# Patient Record
Sex: FEMALE | Race: WHITE
[De-identification: names, ages, dates, MRNs, and addresses within clinical notes are randomized per-mention and may not be internally consistent; named-entity substitution may affect disease eponyms.]

---

## 2019-06-24 ENCOUNTER — HOSPITAL ENCOUNTER (INPATIENT)
Dept: HOSPITAL 84 - D.SDCHOLD | Age: 54
LOS: 23 days | Discharge: HOME | DRG: 743 | End: 2019-07-17
Attending: OBSTETRICS & GYNECOLOGY | Admitting: OBSTETRICS & GYNECOLOGY
Payer: MEDICAID

## 2019-06-24 VITALS — WEIGHT: 143.3 LBS | BODY MASS INDEX: 24.46 KG/M2 | HEIGHT: 64 IN | BODY MASS INDEX: 24.46 KG/M2

## 2019-06-24 DIAGNOSIS — D25.9: ICD-10-CM

## 2019-06-24 DIAGNOSIS — N93.8: Primary | ICD-10-CM

## 2019-06-24 NOTE — OP
PATIENT NAME:  PIPE HERNANDEZ                              MEDICAL RECORD: [id number]
:69                                             LOCATION:[facility]     D.[id number]
                                                         ADMISSION DATE:07/15/19
SURGEON:  ROBEL GILBERT MD          
 
 
DATE OF OPERATION:  07/15/2019
 
PREOPERATIVE DIAGNOSES:
1.  Dysfunctional uterine bleeding.
2.  Fibroid uterus.
 
POSTOPERATIVE DIAGNOSES:
1.  Dysfunctional uterine bleeding.
2.  Fibroid uterus.
 
PROCEDURE:
1.  Exploratory laparotomies.
2.  Bilateral salpingo-oophorectomy.
3.  Total abdominal hysterectomy.
 
SURGEON:  Robel Gilbert MD
 
CRNA:  Elias Sen.
 
ANESTHESIOLOGIST:  Dr. Rivera
 
ANESTHETIC:  General.
 
FINDINGS:  Uterus is enlarged with multiple irregularities to the contour of the
surface consistent with large fibroid uterus.  The ovaries, tubes were
unremarkable bilaterally.  The blood vessels of the uterine vascular bundle are
engorged.  Uterine weight was 1460 grams.
 
SPECIMENS REMOVED:
1.  Bilateral tubes.
2.  Bilateral ovaries.
3.  Uterus with cervix.
 
SPECIMEN DISPOSITION:  All specimens to pathology.
 
ESTIMATED BLOOD LOSS:  200 cc.
 
FLUIDS:  1500 cc lactated Ringer's.
 
URINE OUTPUT:  500 cc of clear urine.
 
COMPLICATIONS:  None.
 
DRAINS:  Romano to gravity.
 
INDICATIONS:  The patient is a 53-year-old female with heavy periods with pain
and pressure.  The patient was found to have fibroid uterus.  The patient was
consented for a OZZY-BSO due to uterine size.
 
DESCRIPTION OF PROCEDURE:  After informed consent was assured, the patient was
taken to the operating room where anesthetic was obtained without difficulty. 
The patient was prepped and draped in the usual sterile fashion.  Then,
 
 
 
OPERATIVE REPORT                               [id number]    PIPE HERNANDEZ            
 
 
Pfannenstiel skin incision was made and the abdomen was entered.  The uterus was
identified, brought to the incision and elevated.  The right round ligament was
elevated with a Galina clamp and a stitch applied to the round ligament
laterally.  The round ligament was incised and the broad ligament was opened
with Bovie cautery.  Using a suction tip catheter to undermine the peritoneum,
the dissection was carried down inferiorly to the midline CHCF developing the
bladder flap.  Posteriorly, the tissues taken down and a window was created in
the posterior leaf of the broad ligament.  Two clamps were placed through this
window below the ovary and tube.  The third clamp was passed in this opening
medial to prevent backbleeding.  The pedicle was now mobilized sharply and a tie
on a pass and a fore-and-aft stitch applied on the infundibulopelvic ligament to
obtain hemostasis.  The connective tissue of the right vascular bundle was taken
down and then 2 clamps applied at the level of the internal os.  A straight
clamp was placed above this to prevent backbleeding.  The pedicle was mobilized
with scissors.  A simple stitch and then a Gregorio stitch applied for hemostasis
at the right vascular bundle.  Attention was now directed to the left side.  The
left round ligament was elevated and entered in similar fashion.  Again, the
peritoneum was undermined inferiorly until the bladder flap was now fully
developed.  The posterior window was made in the broad ligament and 2 clamps
passed below the left tube and ovary.  This pedicle was mobilized with scissors.
 A tie and a pass and a fore-and-aft stitch applied for hemostasis at the
infundibulopelvic ligament.  The connective tissue of the left vascular bundle
was taken down and 2 clamps were applied here.  Straight clamps were applied for
backbleeding and then the pedicle mobilized.  A simple stitch and a Gregorio
stitch applied for hemostasis at the left vascular bundle.  The uterus was now
removed and the cervix grasped with Kocher clamps and elevated.  Using straight
clamps, the cardinal ligament serially clamped and cut and tied until the level
of the uterosacral ligaments were reached and then the vagina was cross-clamped
with 2 Gregorio clamps across on the uterosacral ligaments and the inferior margin
of the cervix.  The cervix was now removed with Fei scissors and the cuff
closed with interrupted stitches.  The pelvis was irrigated twice and irrigant
removed.  Small bleeding was found at the left vascular bundle and right angle
clamp was placed over this and then a figure-of-eight stitch of 3-0 Vicryl
placed to obtain hemostasis.  Again, the pelvis was irrigated and the irrigant
removed.  Hemostasis was now adequate in the pelvis.  The rectus bellies were
inspected and found to be hemostatic.  Fascia was closed with a looped PDS. 
Subcutaneous tissue inspected and bleeding vessels cauterized.  Initially stitch
was used to close the skin and bleeding occurs at the left margin.  The stitch
was removed and the vessel cauterized.  Skin was now reapproximated with staples
and a pressure dressing applied.  Sponge, lap, needle counts were correct times
2.
 
TRANSINT:[id number] Voice Confirmation ID: [id number] DOCUMENT ID: [id number]
                                           
                                           ROBEL GILBERT MD          
 
 
CC:                                                             [id number]
DICTATION DATE: 07/15/19 0901     :     07/15/19 1037      ADM IN  
                                                                              
[facility]                                          
[address] [id number]

## 2019-07-11 LAB
ANION GAP SERPL CALC-SCNC: 9.4 MMOL/L (ref 8–16)
BASOPHILS NFR BLD AUTO: 0.2 % (ref 0–2)
BUN SERPL-MCNC: 6 MG/DL (ref 7–18)
CALCIUM SERPL-MCNC: 8.4 MG/DL (ref 8.5–10.1)
CHLORIDE SERPL-SCNC: 107 MMOL/L (ref 98–107)
CO2 SERPL-SCNC: 27.5 MMOL/L (ref 21–32)
CREAT SERPL-MCNC: 0.6 MG/DL (ref 0.6–1.3)
EOSINOPHIL NFR BLD: 0.7 % (ref 0–7)
ERYTHROCYTE [DISTWIDTH] IN BLOOD BY AUTOMATED COUNT: 13.6 % (ref 11.5–14.5)
GLUCOSE SERPL-MCNC: 86 MG/DL (ref 74–106)
HCT VFR BLD CALC: 42.7 % (ref 36–48)
HGB BLD-MCNC: 14.3 G/DL (ref 12–16)
IMM GRANULOCYTES NFR BLD: 0.2 % (ref 0–5)
LYMPHOCYTES NFR BLD AUTO: 14.7 % (ref 15–50)
MCH RBC QN AUTO: 30.6 PG (ref 26–34)
MCHC RBC AUTO-ENTMCNC: 33.5 G/DL (ref 31–37)
MCV RBC: 91.2 FL (ref 80–100)
MONOCYTES NFR BLD: 5.2 % (ref 2–11)
NEUTROPHILS NFR BLD AUTO: 79 % (ref 40–80)
OSMOLALITY SERPL CALC.SUM OF ELEC: 275 MOSM/KG (ref 275–300)
PLATELET # BLD: 255 10X3/UL (ref 130–400)
PMV BLD AUTO: 10.1 FL (ref 7.4–10.4)
POTASSIUM SERPL-SCNC: 3.9 MMOL/L (ref 3.5–5.1)
RBC # BLD AUTO: 4.68 10X6/UL (ref 4–5.4)
SODIUM SERPL-SCNC: 140 MMOL/L (ref 136–145)
WBC # BLD AUTO: 9 10X3/UL (ref 4.8–10.8)

## 2019-07-15 VITALS — SYSTOLIC BLOOD PRESSURE: 117 MMHG | DIASTOLIC BLOOD PRESSURE: 83 MMHG

## 2019-07-15 VITALS — DIASTOLIC BLOOD PRESSURE: 89 MMHG | SYSTOLIC BLOOD PRESSURE: 169 MMHG

## 2019-07-15 VITALS — DIASTOLIC BLOOD PRESSURE: 87 MMHG | SYSTOLIC BLOOD PRESSURE: 155 MMHG

## 2019-07-15 VITALS — SYSTOLIC BLOOD PRESSURE: 121 MMHG | DIASTOLIC BLOOD PRESSURE: 73 MMHG

## 2019-07-15 VITALS — SYSTOLIC BLOOD PRESSURE: 113 MMHG | DIASTOLIC BLOOD PRESSURE: 68 MMHG

## 2019-07-15 VITALS — SYSTOLIC BLOOD PRESSURE: 139 MMHG | DIASTOLIC BLOOD PRESSURE: 80 MMHG

## 2019-07-15 VITALS — SYSTOLIC BLOOD PRESSURE: 107 MMHG | DIASTOLIC BLOOD PRESSURE: 68 MMHG

## 2019-07-15 VITALS — DIASTOLIC BLOOD PRESSURE: 87 MMHG | SYSTOLIC BLOOD PRESSURE: 146 MMHG

## 2019-07-15 VITALS — DIASTOLIC BLOOD PRESSURE: 95 MMHG | SYSTOLIC BLOOD PRESSURE: 171 MMHG

## 2019-07-15 VITALS — SYSTOLIC BLOOD PRESSURE: 156 MMHG | DIASTOLIC BLOOD PRESSURE: 94 MMHG

## 2019-07-15 VITALS — DIASTOLIC BLOOD PRESSURE: 86 MMHG | SYSTOLIC BLOOD PRESSURE: 166 MMHG

## 2019-07-15 VITALS — DIASTOLIC BLOOD PRESSURE: 95 MMHG | SYSTOLIC BLOOD PRESSURE: 156 MMHG

## 2019-07-15 VITALS — DIASTOLIC BLOOD PRESSURE: 86 MMHG | SYSTOLIC BLOOD PRESSURE: 153 MMHG

## 2019-07-15 VITALS — SYSTOLIC BLOOD PRESSURE: 127 MMHG | DIASTOLIC BLOOD PRESSURE: 73 MMHG

## 2019-07-15 VITALS — DIASTOLIC BLOOD PRESSURE: 76 MMHG | SYSTOLIC BLOOD PRESSURE: 123 MMHG

## 2019-07-15 VITALS — SYSTOLIC BLOOD PRESSURE: 157 MMHG | DIASTOLIC BLOOD PRESSURE: 90 MMHG

## 2019-07-15 VITALS — DIASTOLIC BLOOD PRESSURE: 89 MMHG | SYSTOLIC BLOOD PRESSURE: 158 MMHG

## 2019-07-15 VITALS — DIASTOLIC BLOOD PRESSURE: 68 MMHG | SYSTOLIC BLOOD PRESSURE: 113 MMHG

## 2019-07-15 LAB — HCG UR QL: NEGATIVE

## 2019-07-15 PROCEDURE — 0UT70ZZ RESECTION OF BILATERAL FALLOPIAN TUBES, OPEN APPROACH: ICD-10-PCS | Performed by: OBSTETRICS & GYNECOLOGY

## 2019-07-15 PROCEDURE — 0UT20ZZ RESECTION OF BILATERAL OVARIES, OPEN APPROACH: ICD-10-PCS | Performed by: OBSTETRICS & GYNECOLOGY

## 2019-07-15 PROCEDURE — 0UT90ZZ RESECTION OF UTERUS, OPEN APPROACH: ICD-10-PCS | Performed by: OBSTETRICS & GYNECOLOGY

## 2019-07-15 NOTE — NUR
DR GILBERT ON UNIT, REPORT GIVEN ON PT'S BP. DR GILBERT STATES THAT BP IS
NOT TO CONCERNING LEVEL AT THIS TIME, ORDERS TO CONTINUE TO MONITOR AT THIS
TIME.

## 2019-07-15 NOTE — NUR
THIS RN TO ROOM FOR PT CHECK AND COMPLETE ADMIT ASSESSMENT AND HX PER PT MORE
WAKEFUL STATUS. PT RATES PAIN APPROX 7/10 AT THIS TIME. WILL ADMIN TORADOL AS
ORDERED.

## 2019-07-15 NOTE — NUR
THIS RN TO ROOM FOR PT CHECK. PT VISITING WITH VISITOR. ABD DSG NOTED TO BE
C/D/I. PERIPAD CHECKED AND NOTED TO HAVE SCANT VAGINAL BLEEDING. WILL CONT TO
MONITOR.

## 2019-07-15 NOTE — NUR
THIS RN TO ROOM FOR PT CHECK. PT RESTING QUIETLY WITH LIGHTS DIMMED, STATES
SHE FEELS MUCH BETTER WITH LOW STIMULATION IN ROOM. 1 VISITOR NOTED ON BEDSIDE
COUCH. LORETTA Pascual IN REACH. WILL CONT TO MONITOR.

## 2019-07-15 NOTE — NUR
PT'S DAUGHTER AT , REPORTS PT VOMITED, THIS RN TO ROOM, PT VOMITED
APPROX 25 MLS OF GREENISH BROWN EMESIS, ADM ZOFRAN SIVP PER MD ORDERS, SEE
EMAR, PT DENIES FURTHER NEEDS

## 2019-07-15 NOTE — NUR
PT RCVD TO ROOM 1274 VIA BED FROM PACU. PT DROWSY, RATES PAIN 7/10 AT THIS
TIME. LR INFUSING AS ORDERED TO PT'S LEFT FOREARM. ABD DGS OVER LOW TRANSVERSE
INCISION IS C/D/I. SMALL AMT VAGINAL BLEEDING NOTED, PERIPAD PLACED TO
MONITOR. MARTÍNEZ CATH DRAINING CLEAR YELLOW URINE TO BEDSIDE DRAINAGE, 30ML
NOTED IN UROMETER. TUBING SECURED VIA STAT LOCK TO PT'S INNER LEFT THIGH.
SCD'S ON LE BILAT AND ON PUMP. PT DENIES NAUSEA. WILL SET UP PCA AS ORDERED.
PT FAMILY TO ROOM AT THIS TIME.

## 2019-07-15 NOTE — NUR
ASSESSMENT PER FLOW SHEET, VS OBTAINED, IV IN LEFT FA INTACT WITH NO REDNESS
OR EDEMA INFUSING VIA PUMP LR  ML/HR, MORPHINE PCA FOR PAIN MANAGEMENT,
PT RATES INC PAIN 8/10, PT INST ON AND PUSHES PCA BUTTON AT THIS TIME, BIKINI
INC WITH LARGE DRESSING CDI, FRESH ICE PACK TO ABD, MARTÍNEZ CATH INTACT DRAINING
DARK YELLOW URINE, EMPTIED 175 MLS FROM MARTÍNEZ CHAMBER TO MARTÍNEZ BAG, PT DENIES
FLATUS, SCD'S ON AND WORKING PROPERLY, PT REQUESTED AND SERVED FRESH ICE
CHIPS, DENIES FURTHER NEEDS, DINNER TRAY REMOVED, BED IN LOW POSITION, SIDE
RAILS X 2, CALL LIGHT IN REACH

## 2019-07-15 NOTE — NUR
THIS RN TO ROOM FOR PT CHECK AND EMPTY MARTÍNEZ UROMETER. PT REPORTS GOOD PAIN
RELIEF WITH PCA, DENIES NEEDS. WILL CONT TO MONITOR.

## 2019-07-15 NOTE — NUR
THIS RN TO PT ROOM FOR PAIN REASSESSMENT AND PT CHECK. PT VERBALIZES PAIN
RELIEF, STATES SHE STILL FEELS SOME ABD PRESSURE. MARTÍNEZ CATH NOTED TO BE
DRAINING CLEAR YELLOW URINE, UROMETER EMPTIED. PT DAUGHTER AT BEDSIDE. WILL
CONT TO MONITOR.

## 2019-07-15 NOTE — NUR
PT AND FAMILY INSTRUCTED ON NEED FOR LOW STIMULATION PER PT ELEVATED BP'S.
UNDERSTANDING VERBALIZED. LIGHTS IN ROOM DIMMED AND TV VOLUME LOWERED. PT
FAMILY MEMBER STATES SHE WILL BE LEAVING SOON AND WILL INTERCHANGE VISITORS TO
LIMIT TO ONE. PT ADMIN PRN DOSE OF 1MG MORPHINE VIA PCA AS ORDERED FOR PAIN
RATED 7/10. WILL CONT TO MONITOR.

## 2019-07-15 NOTE — NUR
PT RESTING WITH EYES CLOSED, RESP QUIET, NO DISTRESS NOTED, LEFT UNDISTURBED
AT THIS TIME, BED IN LOW POSITION, SIDE RAILS X 2, CALL LIGHT IN REACH,
DAUGHTER ASLEEP ON COUCH

## 2019-07-15 NOTE — NUR
NEW VIAL OF MORPHINE UP IN PCA AT THIS TIME. 1MG BOLUS ADMINISTERED FOR PT C/O
INCISIONAL PAIN OF "7" ON 0-10 PAIN SCALE.

## 2019-07-16 VITALS — DIASTOLIC BLOOD PRESSURE: 59 MMHG | SYSTOLIC BLOOD PRESSURE: 104 MMHG

## 2019-07-16 VITALS — DIASTOLIC BLOOD PRESSURE: 68 MMHG | SYSTOLIC BLOOD PRESSURE: 119 MMHG

## 2019-07-16 VITALS — SYSTOLIC BLOOD PRESSURE: 93 MMHG | DIASTOLIC BLOOD PRESSURE: 57 MMHG

## 2019-07-16 VITALS — SYSTOLIC BLOOD PRESSURE: 118 MMHG | DIASTOLIC BLOOD PRESSURE: 66 MMHG

## 2019-07-16 VITALS — DIASTOLIC BLOOD PRESSURE: 65 MMHG | SYSTOLIC BLOOD PRESSURE: 103 MMHG

## 2019-07-16 VITALS — SYSTOLIC BLOOD PRESSURE: 123 MMHG | DIASTOLIC BLOOD PRESSURE: 71 MMHG

## 2019-07-16 VITALS — DIASTOLIC BLOOD PRESSURE: 64 MMHG | SYSTOLIC BLOOD PRESSURE: 106 MMHG

## 2019-07-16 LAB
ANION GAP SERPL CALC-SCNC: 8.4 MMOL/L (ref 8–16)
BASOPHILS NFR BLD AUTO: 0.1 % (ref 0–2)
BUN SERPL-MCNC: 6 MG/DL (ref 7–18)
CALCIUM SERPL-MCNC: 7.9 MG/DL (ref 8.5–10.1)
CHLORIDE SERPL-SCNC: 106 MMOL/L (ref 98–107)
CO2 SERPL-SCNC: 28.8 MMOL/L (ref 21–32)
CREAT SERPL-MCNC: 0.6 MG/DL (ref 0.6–1.3)
EOSINOPHIL NFR BLD: 0 % (ref 0–7)
ERYTHROCYTE [DISTWIDTH] IN BLOOD BY AUTOMATED COUNT: 14 % (ref 11.5–14.5)
GLUCOSE SERPL-MCNC: 88 MG/DL (ref 74–106)
HCT VFR BLD CALC: 38.3 % (ref 36–48)
HGB BLD-MCNC: 12.6 G/DL (ref 12–16)
IMM GRANULOCYTES NFR BLD: 0.3 % (ref 0–5)
LYMPHOCYTES NFR BLD AUTO: 16 % (ref 15–50)
MCH RBC QN AUTO: 30.2 PG (ref 26–34)
MCHC RBC AUTO-ENTMCNC: 32.9 G/DL (ref 31–37)
MCV RBC: 91.8 FL (ref 80–100)
MONOCYTES NFR BLD: 9.4 % (ref 2–11)
NEUTROPHILS NFR BLD AUTO: 74.2 % (ref 40–80)
OSMOLALITY SERPL CALC.SUM OF ELEC: 274 MOSM/KG (ref 275–300)
PLATELET # BLD: 229 10X3/UL (ref 130–400)
PMV BLD AUTO: 10 FL (ref 7.4–10.4)
POTASSIUM SERPL-SCNC: 4.2 MMOL/L (ref 3.5–5.1)
RBC # BLD AUTO: 4.17 10X6/UL (ref 4–5.4)
SODIUM SERPL-SCNC: 139 MMOL/L (ref 136–145)
WBC # BLD AUTO: 10.6 10X3/UL (ref 4.8–10.8)

## 2019-07-16 NOTE — NUR
PT UP TO SHOWER, GAIT STEADY, ALL TOILETRIES AND LINENS PROVIDED, PT VOIDED
400 MLS OF LIGHTLY BLOOD TINGED URINE BY SELF WITH NO DIFFICULTY, PT TO
SHOWER, PT TO USE CALL LIGHT IN BR FOR ANY ASSISTANCE

## 2019-07-16 NOTE — NUR
PT RESTING WITH EYES CLOSED, RESP QUIET, NO DISTRESS NOTED, LEFT UNDISTURBED
AT THIS TIME, SCD'S ON AND WORKING PROPERLY, BED IN LOW POSITION, SIDE RAILS X
2, CALL LIGHT IN REACH

## 2019-07-16 NOTE — NUR
THIS RN TO BEDSIDE TO GET PT UP TO AMBULTE. PT CURRENTLY AA&O X4. PAIN
ASSESSED. PT REPORTS SHE DOESN'T CURRENTLY HAVE ANY PAIN. PT OOB W/OUT ASSIST.
SOCKS APPLIED AND ADDITIONAL GOWN AND PANTIES PROVIDED. PT AMBULATES IN HAN
FROM ROOM TO WAITING ROOM DOOR TO OR DOORS AND RETURNS TO ROOM. PT TO BR AT
THIS TIME TO VOID. VOIDS APPROX 150ML. BED LINENS CHANGED. HOUSE KEEPING TO
ROOM. PT BACK TO BED. SCD WRAPS PLACED BACK ON PT BILATERALLY. RECONNECTED TO
PUMP. PUMP IS ON AND FUNCTIONING. PT REPORTS "A LITTLE PAIN" AFTER AMBULATING.
NO REQUEST FOR ADDITIONAL PAIN INTERVENTIONS AT THIS TIME. BED LOW, SIDE RAILS
UPX 2. CALL LIGHT AND PHONE AT PT'S SIDE. FAMILY AT BEDSIDE.

## 2019-07-16 NOTE — NUR
THIS RN TO BEDSIDE. PT AA&O X 4. PAIN ASSESSED. PT CURRENTLY RATES PAIN 7/10
AND DESCRIBES AS PRESSURE. PT INFORMED OF POC FOR THE DAY. PT VERBALIZIES
UNDERSTANDING AND AGREEBLE. PT PUSHES PCA BUTTON ONCE MORE.TORADOL 15MG SIVP
ADMIN PER ORDERS. IV ALLOWED TO CONTINUE TO INFUSE AT THIS TIME. VITALS SIGNS
OBTAINED. 02 OFF AT THIS TIME.
SEE FLOWSHEET. MARTÍNEZ CATH D/C'D INTACT. APPROX 550ML NOTED IN MARTÍNEZ
BAG. SMALL AMOUNT OF BRIGHT RED VAG BLEEDING NOTED. PERICARE DONE AND CLEAN
PAD PLACED. SCD WRAPS REMAIN IN PLACE. CONNECTED TO PUMP AND PUMP REMAINS ON
UNTIL PT COMPLETES EATING HER REGULAR BREAKFAST TRAY. IV SITE SALINE LOCKED AT
THIS TIME. FRESH LEMON-LIME SODA SERVED. FAMILY AT BEDSIDE. BED LOW, SIDERAILS
UP X 2. CALL LIGHT AND PHONE AT BEDSIDE.

## 2019-07-16 NOTE — NUR
ADM PAIN MED PER MD ORDERS, SEE EMAR, WITH LEMON LIME SODA PER PT'S REQUEST,
PT DENIES FURTHER NEEDS, BED IN LOW POSITION, SIDE RAILS X 2, CALL LIGHT IN
REACH

## 2019-07-16 NOTE — NUR
PT OUT OF SHOWER, ASSISTED PT WITH DRYING OFF, DIEGO PAD, PANTIES, AND CLEAN
GOWN APPLIED, PT BACK TO BED, SCD'S REAPPLIED AND WORKING PROPERLY, DRESSING
REMOVED, INC WITH STAPLES CDI WITH NO DRAINAGE NOTED, DIEGO PAD OVER INC FOR
COMFORT AND MOISTURE CONTROL, PT INST ON INC CARE, VERBALIZES UNDERSTANDING,
WILL ADM PAIN MED

## 2019-07-16 NOTE — NUR
PT IN SEMI FOWLERS POSITION. PHYSICAL ASSESSMENT DONE, SEE SHIFT ASSESSMENT.
SALINE LOCK NOTED IN LEFT FA. SITE C/D/I. BOWEL SOUNDS ACTIVE IN UPPER
QUADRANTS X 2 AND HYPOACTIVE IN LOWER QUADRANTS. PT REPORTS PASSING GAS SINCE
DELIVERY AND DENIES NAUSEA AFTER EATING REGULAR DIET BREAKFAST TRAY. ABD
DRESSING IN PLACE OVER LOWER ABDOMEN.DRESSING C/D/I. SMALL AMOUNT OF BRIGHT
RED BLEEDING NOTED ON DIEGO PAD. PT REPORTS THAT SHE HAS HAD SAME PAD IN PLACE
FOR 3O MINUTES. PT INSTRUCTED TO CALL OUT TO RN ANYTIME SHE CHANGES HER PAD SO
THAT BLEEDING MAY BE MONITORED. PT VERBALIZES UNDERSTANDING.

## 2019-07-16 NOTE — NUR
PT BACK TO BED AFTER [id number] OF BLOOD TINGED URINE INTO [place] HAT.LIGHT
BRIGHT RED BLEEDING NOTED ON DIEGO PAD. CLEAN DIEGO PAD APPLIED. WILL CONTINUE
TO MONITOR.
PT
RATING PAIN AS 7/10 AT INCISION SITE AND REQUESTING MEDICATION. PERCOCET
5/325MG GIVEN PO.

## 2019-07-16 NOTE — NUR
THIS RN TO BEDSIDE FOR PAIN REASSESSMENT AND TO SEE IF PT HAS FINISHED AND
TOLERATED REG BREAKFAST. PT HAS EATEN 100% OF BREAKFAST TRAY AND REPORTS
TOLERATING IT. PT REQUEST TO BE PREMEDICATED BEFORE HAVING TO GET UP TO
AMBULATE. PT MEDICATED W/PERCOCET 5/325MG PO. PT INFORMED THE PLAN WILL BE
AMBULATE IN THE HALLS AT 1015. PT AGREEABLE.

## 2019-07-16 NOTE — NUR
PT RESTING QUIETLY WITH EYES CLOSED, RESPIRATIONS EVEN AND NON LABORED. PCA
BUTTON HANDED TO PT AFTER SHE STATES SHE NEEDS IT. NO FURTHER NEEDS
IDENTIFIED. WILL CONTINUE TO MONITOR

## 2019-07-16 NOTE — NUR
ADM TORADOL SIVP PER MD ORDERS, SEE EMAR, NO NEEDS VOICED, SCD'S CONTINUE ON
AND WORKING PROPERLY, BED IN LOW POSITION, SIDE RAILS X 2, CALL LIGHT IN
REACH, DAUGHTER ASLEEP ON COUCH

## 2019-07-16 NOTE — NUR
PT RESTING WITH EYES CLOSED, AROUSES TO SOFT VERBAL STIMULATION, VS OBTAINED,
MARTÍNEZ CATH EMPTIED, PT INST ON AND DEMONSTRATED I.S. WITH GOOD EFFORT, PT
DENIES NEEDS OR PAIN AT THIS TIME, PT STATES "I DON'T HAVE ANY PAIN RIGHT
NOW", SCD'S CONTINUE ON AND WORKING PROPERLY, BED IN LOW POSITION, SIDE RAILS
X 2, CALL LIGHT IN REACH, PT'S DAUGHTER ASLEEP AT BEDSIDE

## 2019-07-16 NOTE — NUR
PT IN HIGH FOWLERS POSITION. TORADOL GIVEN AS ORDERED. PT TOLERATED WELL AND
DENIES CHANGING PAD OR NEEDS AT THIS TIME.

## 2019-07-16 NOTE — NUR
ASSESSMENT PER FLOW SHEET, VS OBTAINED, SALINE LOCK TO LEFT FA INTACT WITH NO
REDNESS OR EDEMA, [employer] INC WITH LARGE DRESSING CDI, INFORMED PT THAT SHE
NEEDS TO TAKE A SHOWER SO THE DRESSING CAN BE REMOVED, PT STATES "OH GOOD, I
REALLY WANT TO TAKE ONE", PT DENIES FLATUS, NO BM AND VOIDING WITH NO
DIFFICULTY, SCANT VAG BLEEDING NOTED WITH NO CLOTS ON DIEGO PAD, SCD'S ON AND
WORKING PROPERLY, DINNER TRAY REMOVED

## 2019-07-16 NOTE — NUR
PM ROUNDS MADE, PT RESTING WITH EYES CLOSED, RESP QUIET, NO DISTRESS NOTED,
LEFT UNDISTURBED AT THIS TIME, BED IN LOW POSITION, SIDE RAILS X 2, CALL LIGHT
IN REACH

## 2019-07-16 NOTE — NUR
PT RESTING WITH EYES CLOSED, AROUSES TO SOFT VERBAL STIMULATION, VS OBTAINED,
ADM TORADOL PER MD ORDERS, SEE EMAR, PT DENIES NEEDS OR PAIN AT THIS TIME,
SCD'S CONTINUE ON AND WORKING PROPERLY, BED IN LOW POSITION, SIDE RAILS X 2,
CALL LIGHT IN REACH

## 2019-07-17 VITALS — SYSTOLIC BLOOD PRESSURE: 113 MMHG | DIASTOLIC BLOOD PRESSURE: 69 MMHG

## 2019-07-17 VITALS — DIASTOLIC BLOOD PRESSURE: 77 MMHG | SYSTOLIC BLOOD PRESSURE: 105 MMHG

## 2019-07-17 NOTE — NUR
PT RESTING WITH EYES CLOSED, RESP QUIET, NO DISTRESS NOTED, LEFT UNDISTURBED
AT THIS TIME, SCD'S CONTINUE ON AND WORKING PROPERLY, BED IN LOW POSITION,
SIDE RAILS X 2, CALL LIGHT IN REACH

## 2019-07-17 NOTE — NUR
PT READY FOR DISCHARGE. DISCHARGED IN STABLE CONDITION VIA WHEELCHAIR PER
AUXILIARY STAFF TO PRIVATE VEHICLE.

## 2019-07-17 NOTE — NUR
PT RESTING WITH EYES CLOSED, AROUSES TO SOFT VERBAL STIMULATION, VS OBTAINED,
ADM TORADOL PER MD ORDERS, SEE EMAR, PT DENIES NEEDS AT THIS TIME, SCD'S
CONTINUE ON AND WORKING PROPERLY, BED IN LOW POSITION, SIDE RAILS X 2, CALL
LIGHT IN REACH

## 2019-07-17 NOTE — NUR
DISCHARGE INSTRUCTIONS GIVEN TO PT. PT VERBALIZES UNDERSTANDING OF ALL
INSTRUCTIONS. COPIES GIVEN TO PT. PT GIVEN RX FOR NEURONTIN, PERCOCET, MOBIC
AND ESTRACE. PT PREPARES FOR DISCHARGE.

## 2019-07-17 NOTE — NUR
RECEIVED PT LYING IN SEMI-STEVENS'S POSITION. AWAKE. AAO X 3. VSS. HRRR WITHOUT
AUDIBLE MURMUR. BBS CLEAR. BS X 4. ABDOMEN SOFT/NON-DISTENDED. PT DENIES
PASSING GAS. ABDOMINAL INCISION WITHOUT REDNESS, SWELLING OR DRAINAGE NOTED.
STAPLES INTACT. SMALL AMT OF SEROSANGUINOUS DISCHARGE NOTED TO PERIPAD. NEG
HOMANS' SIGN. PPP. NO EDEMA NOTED TO BLE. PT C/O INCISIONAL PAIN OF "5" ON
0-10 PAIN SCALE. SL TO LEFT FOREARM. SITE CLEAR. PT STATES DR GILBERT VISITED
WITH PT EARLY AM AND TOLD HER SHE COULD GO HOME THIS AM. PT INFORMED NO ORDER
FOR DISCHARGE NOTED. WILL NOTIFY DR GILBERT. SR UP X 2. CALL LIGHT IN REACH.

## 2019-07-17 NOTE — NUR
PT C/O INCISIONAL PAIN OF "5" ON 0-10 PAIN SCALE. PERCOCET 5/325 GIVEN PO AS
ORDERED. PT INSTRUCTED ON MED. VERBALIZES UNDERSTANDING.

## 2019-07-17 NOTE — NUR
PT WATCHING TV, POC DISCUSSED WITH PT, PT VERBALIZES UNDERSTANDING, DENIES
NEEDS OR PAIN AT THIS TIME

## 2022-09-18 NOTE — NUR
REPORT OF SMALL AMOUNT OF BRIGHT RED BLEEDING CALLED TO DR GILBERT. ORDER
RECIEVED TO CONTINUE TO MONITOR AT THIS TIME. Patent